# Patient Record
(demographics unavailable — no encounter records)

---

## 2025-06-27 NOTE — PLAN
[TextEntry] : We discussed at length with the patient the options for treatment.  We discussed conservative care including physical therapy, acupuncture, massage therapy and chiropractic care.  We discussed injection therapy and even surgical intervention should the patient fail conservative care.  We discussed, risks, benefits, complications, alternatives, outcomes and expectations.   All questions answered.  Patient is being referred for physical therapy for various modalities.  If no improvement, consider MRI.

## 2025-06-27 NOTE — PHYSICAL EXAM
[Normal Mood and Affect] : normal mood and affect [Able to Communicate] : able to communicate [Well Developed] : well developed [Well Nourished] : well nourished [NL (30)] : right lateral bending 30 degrees [Facet arthropathy] : Facet arthropathy [Disc space narrowing] : Disc space narrowing [AP] : anteroposterior [Lateral] : lateral [There are no fractures, subluxations or dislocations. No significant abnormalities are seen] : There are no fractures, subluxations or dislocations. No significant abnormalities are seen [Left] : left hip [5___] : adduction 5[unfilled]/5 [2+] : posterior tibialis pulse: 2+ [FreeTextEntry1] : DDD L4-L5, L5-S1, Left side scoliosis [TWNoteComboBox7] : forward flexion 60 degrees [] : negative impingement maneuvers

## 2025-06-27 NOTE — HISTORY OF PRESENT ILLNESS
[de-identified] : 06/27/30: Initial visit for this 46-year-old male, RHD, complaining of spontaneous onset of left gluteal pain x 4 weeks ago. No history of injury or trauma. Tylenol prn with minor relief. Worsens on walking. Can usually walk 3-5 miles/day. Occasional limp. No wake-up pain at night. Needs a new PT script.          Also complains of left thumb pain x 3 months. He states he might have injured it while closing the attic door and thinks he might have jammed his thumb and his pain has stayed consistent since then.    PMH: Cardiac Defib x 17 years ago. Takes Eliquis and metoprolol.